# Patient Record
Sex: MALE | Race: WHITE
[De-identification: names, ages, dates, MRNs, and addresses within clinical notes are randomized per-mention and may not be internally consistent; named-entity substitution may affect disease eponyms.]

---

## 2020-09-12 ENCOUNTER — HOSPITAL ENCOUNTER (EMERGENCY)
Dept: HOSPITAL 74 - JER | Age: 55
Discharge: HOME | End: 2020-09-12
Payer: COMMERCIAL

## 2020-09-12 VITALS — SYSTOLIC BLOOD PRESSURE: 112 MMHG | DIASTOLIC BLOOD PRESSURE: 63 MMHG | HEART RATE: 68 BPM

## 2020-09-12 VITALS — BODY MASS INDEX: 31.1 KG/M2

## 2020-09-12 DIAGNOSIS — H61.21: Primary | ICD-10-CM

## 2020-09-12 NOTE — PDOC
History of Present Illness





- General


Chief Complaint: Ear Problem


Stated Complaint: NECK PAIN


Time Seen by Provider: 09/12/20 08:38


History Source: Patient


Exam Limitations: No Limitations





- History of Present Illness


Initial Comments: 





09/12/20 09:05


55y M hx of HIV, cHF, presents with R ear discomfort. The patient notes that he 

is unable to hear from hsi R ear for a few days. he denies any ear pain, neck 

pain, headache, fever/chills, n/v, recent illness or uri.  no recen 

instrumentation or ent procedures. he has been using debrox for a few days 

without significant improvement. 











Is this a multiple visit Asthma Patient?: No





Past History





- Medical History


Allergies/Adverse Reactions: 


                                    Allergies











Allergy/AdvReac Type Severity Reaction Status Date / Time


 


No Known Allergies Allergy   Verified 09/12/20 08:08











Home Medications: 


Ambulatory Orders





Ergocalciferol (Vitamin D2) [Vitamin D2] 50,000 unit PO WEEKLY #8 capsule 

02/14/20 


Aspirin [Aspirin EC] 81 mg PO DAILY #30 tablet. 04/21/20 


Atorvastatin Ca [Lipitor] 20 mg PO HS #30 tablet 04/21/20 


Bictegrav/Emtricit/Tenofov Ala [Biktarvy -25 mg Tablet] 1 each PO DAILY 

#30 tablet 04/21/20 


Carvedilol [Coreg -] 1 tab PO BID #60 tablet 04/21/20 


Lisinopril [Zestril] 5 mg PO DAILY #30 tablet 04/21/20 


Bupropion HCl [Bupropion HCl Sr] 100 mg PO BID #60 tab.er.12h 08/12/20 


Quetiapine Fumarate [Seroquel -] 50 mg PO HS #30 tablet 08/12/20 


Zolpidem Tartrate [Ambien] 5 mg PO HS #15 tablet MDD 1 08/12/20 








Anemia: No


Asthma: No


Cancer: No


Cardiac Disorders: Yes (h/o cvd)


CVA: No


COPD: No


CHF: No


Dementia: No


Diabetes: No


GI Disorders: No


 Disorders: No


HTN: Yes


Hypercholesterolemia: Yes


Liver Disease: No


Seizures: No


Thyroid Disease: No





- Surgical History


Abdominal Surgery: No


Appendectomy: No


Cardiac Surgery: No


Cholecystectomy: No


Lung Surgery: No


Neurologic Surgery: No


Orthopedic Surgery: No





- Psycho-Social/Smoking History


Smoking History: Unknown if ever smoked


Have you smoked in the past 12 months: No





- Substance Abuse Hx (Audit-C & DAST Scrn)


How often the patient has a drink containing alcohol: Never


Score: In Men: 4 or > Positive; In Women: 3 or > Positive: 0


Screen Result (Pos requires Nsg. Audit-10AR): Negative


In the last yr the pt used illegal drug/Rx for NonMed reason: No


Score:  Yes response is considered Positive: 0


Screen Result (Positive result requires Nsg. DAST-10): Negative





**Review of Systems





- Review of Systems


Able to Perform ROS?: Yes


Comments:: 





09/12/20 09:06


Constitutional - no reported Fever, Chills, 


HEENT: unable to hear on R ear no reported vision changes, sore throat


Abd/GI: no reported nausea, vomiting,


: no reported dysuria, frequency, discharge


Musculskelatal - no reported back pain, joint swelling


skin - no reported bruising, erythema, rash


neurological: no reported headache, numbness, focal weakness, tingling, ataxia, 


hematologic: no reported  easy bruising, easy bleeding








*Physical Exam





- Vital Signs


                                Last Vital Signs











Temp Pulse Resp BP Pulse Ox


 


    68   14   112/63   99 


 


    09/12/20 08:11  09/12/20 08:11  09/12/20 08:11  09/12/20 08:11














- Physical Exam





09/12/20 09:07


GENERAL: The patient is awake, alert, and fully oriented, Nontoxic - in no acute

 distress.


HEAD: Normocephalic, atraumatic.


ENT: impacted cerumen in R ear, no ertyhema, no mastoid tenderness, Normal 

voice,  Moist mucous membranes.


NECK: Normal range of motion, supple











Procedures





- Consent


Consent obtained: Verbal





Medical Decision Making





- Medical Decision Making





09/12/20 09:07


impacted cerumen in the R ear


will disimpact


no signs of infection 





09/12/20 10:52








Patient presenting with R cerumen inpaction.  Cerumen impaction noted and 

irrigation was indicated. Performed R cerumen removal by irrigation w/ Hydrogen 

peroxide and curette. No trauma or complications noted. TM clear bilaterally and

 w/out perforation. Pt reports hearing restored.  Informed to return to [facility] if 

has new or worsening symptoms such as persistent fevers, persistent vomiting, 

decreased PO. Expressed understanding of and agreement with plan and all 

questions answered.         





Impression: 


Cerumen Impaction








Plan: 


Discharge from Emergency Department


Cerumen impaction noted and irrigation is indicated. Performed cerumen removal 

by irrigation w/ H2O and curette (10min). No trauma or complications noted. TM 

clear bilaterally and w/out perforation. Pt reports hearing restored.








Discharge





- Discharge Information


Problems reviewed: Yes


Clinical Impression/Diagnosis: 


 Impacted cerumen of right ear





Hearing loss due to cerumen impaction


Qualifiers:


 Laterality: right Qualified Code(s): H61.21 - Impacted cerumen, right ear





Condition: Improved


Disposition: HOME





- Admission


No





- Follow up/Referral


Referrals: 


[facility] [place] [Provider Group]





- Patient Discharge Instructions


Patient Printed Discharge Instructions:  DI for Cerumen Impaction


Additional Instructions: 


Please follow up with your doctor


return to the ER if you have worsening ear pain, fever/chills, headache, nausea 

or vomiting.








- Post Discharge Activity